# Patient Record
Sex: FEMALE | Race: WHITE
[De-identification: names, ages, dates, MRNs, and addresses within clinical notes are randomized per-mention and may not be internally consistent; named-entity substitution may affect disease eponyms.]

---

## 2017-12-04 ENCOUNTER — HOSPITAL ENCOUNTER (OUTPATIENT)
Dept: HOSPITAL 80 - FLD | Age: 32
Setting detail: OBSERVATION
Discharge: HOME | End: 2017-12-04
Attending: ADVANCED PRACTICE MIDWIFE | Admitting: ADVANCED PRACTICE MIDWIFE
Payer: COMMERCIAL

## 2017-12-04 DIAGNOSIS — O23.42: Primary | ICD-10-CM

## 2017-12-04 DIAGNOSIS — Z3A.27: ICD-10-CM

## 2017-12-04 LAB
BACTERIA #/AREA URNS HPF: (no result) /HPF
COLOR UR: (no result)
MUCOUS THREADS #/AREA URNS LPF: (no result) /LPF
NITRITE UR QL STRIP: NEGATIVE
PH UR STRIP: 8 [PH] (ref 5–7.5)
RBC #/AREA URNS HPF: (no result) /HPF (ref 0–3)
SP GR UR STRIP: 1 (ref 1–1.03)
WBC #/AREA URNS HPF: (no result) /HPF (ref 0–3)

## 2017-12-04 PROCEDURE — G0378 HOSPITAL OBSERVATION PER HR: HCPCS

## 2017-12-04 NOTE — GHP
[f rep st]



                                                            HISTORY AND PHYSICAL





DATE OF ADMISSION:  2017



TRIAGE NOTE



ADMITTING DIAGNOSES:  Intrauterine pregnancy at 27-6/7 weeks' gestation with leakage of fluid, rule o
ut rupture of membranes.



HISTORY OF PRESENT ILLNESS:  The patient is a 32-year-old  3, para 0-0-2-0 with a last menstru
al period of 2017, and an EDC of 2018 which was confirmed by an 8-week ultrasound.  She p
resented with an episode of leakage of clear fluid at 2:30 a.m. as she was sleeping and concern for r
upture of membranes.  The patient has had increased urinary frequency and urinary incontinence in thi
s pregnancy, increasing over the last 2 weeks, but this episode felt different to her, felt more conc
erning for rupture of membranes.  She denies contractions or cramping.  Has had good fetal movement. 
 Does report some urinary frequency, episodes of incontinence, and some vaginal sensitivity, but no i
ncreased vaginal discharge or pruritus, burning, or other symptoms.



PHYSICAL EXAMINATION:  VITAL SIGNS:  Upon evaluation, the patient's vital signs are stable.  PELVIC: 
 Fetal heart tones are 130s, appropriate for gestational age.  She is not prince.  Sterile specu
lum exam is normal external genitalia.  No abnormal discharge.  Wet prep and KOH are negative.  Cervi
x is closed, long, and firm.  



AmniSure was performed which was negative.  A vaginal culture as well as a urinalysis and culture are
 pending.



ASSESSMENT AND PLAN:  32-year-old,  3, para 0-0-2-0 at 27-6/7 weeks' gestation with an episode
 of leakage of fluid, presumed urinary continence and perhaps urinary tract infection.  On urine dip 
she had large blood.  We are awaiting the formal urinalysis and culture.  I will treat her empiricall
y for a urinary tract infection.  Vaginal culture is pending.  I do not assume she has a vaginal infe
ction, and she will be discharged home with normal precautions.





Job #:  [id number]

## 2018-02-03 ENCOUNTER — HOSPITAL ENCOUNTER (OUTPATIENT)
Dept: HOSPITAL 80 - FLD | Age: 33
Setting detail: OBSERVATION
Discharge: HOME | End: 2018-02-03
Attending: OBSTETRICS & GYNECOLOGY | Admitting: ADVANCED PRACTICE MIDWIFE
Payer: COMMERCIAL

## 2018-02-03 DIAGNOSIS — Z3A.36: ICD-10-CM

## 2018-02-03 DIAGNOSIS — Z03.79: Primary | ICD-10-CM

## 2018-02-03 PROCEDURE — G0378 HOSPITAL OBSERVATION PER HR: HCPCS

## 2018-02-03 NOTE — OBPROG
Labor Progress Note


Assessment/Plan: 


Assessment:cat 1 fhr


denies contractions


amnisure sent


negative nitrazine


























Plan:wait for amnisure results. Fu in office this week





02/03/18 12:42





Subjective/Intrapartum Course: 





02/03/18 12:42


Thought possibly rom at [id number] clear fluid. Feeling positive fetal movement. 

Denies contractions








- Contraction Pattern Assessment


Current Contraction Pattern: Irregular





- FHR Assessment


  ** Juarez


FHR (bpm): 140


FHR Pattern Variability: Moderate


FHR Category: 1





- Physical Exam


General Appearance: WD/WN, alert, no apparent distress


Respiratory: chest non-tender, lungs clear, normal breath sounds


Cardiac/Chest: regular rate, rhythm


Abdomen: normal bowel sounds


Extremities: normal range of motion, Carla's sign (negative bilaterally)


DTR- Lower Extremities: Knee (R): 1+, Knee (L): 1+ (no clonus)


Skin: normal color, warm/dry


Neuro/Psych: no motor/sensory deficits, alert, normal mood/affect, oriented x 3





ICD10 Worksheet


Patient Problems: 


 Problems











Problem Status Onset


 


UTI (urinary tract infection) Acute

## 2018-02-03 NOTE — OBPROG
Labor Progress Note


Assessment/Plan: 


Assessment:cat 1 fhr


denies contractions


amnisure sent


negative nitrazine


amnisure negative


























Plan:Dischagre to home with instructions fu this week in the office. Discussed 

 labor precautions





18 12:42





18 12:46





Subjective/Intrapartum Course: 





18 12:42


Thought possibly rom at [phone] clear fluid. Feeling positive fetal movement. 

Denies contractions








- Contraction Pattern Assessment


Current Contraction Pattern: Irregular





ICD10 Worksheet


Patient Problems: 


 Problems











Problem Status Onset


 


UTI (urinary tract infection) Acute
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
## 2018-02-28 ENCOUNTER — HOSPITAL ENCOUNTER (INPATIENT)
Dept: HOSPITAL 80 - FLD | Age: 33
LOS: 5 days | Discharge: HOME | End: 2018-03-05
Attending: OBSTETRICS & GYNECOLOGY | Admitting: OBSTETRICS & GYNECOLOGY
Payer: COMMERCIAL

## 2018-02-28 DIAGNOSIS — O99.820: ICD-10-CM

## 2018-02-28 DIAGNOSIS — O48.0: Primary | ICD-10-CM

## 2018-02-28 DIAGNOSIS — Z3A.40: ICD-10-CM

## 2018-02-28 DIAGNOSIS — D64.9: ICD-10-CM

## 2018-02-28 LAB — PLATELET # BLD: 186 10^3/UL (ref 150–400)

## 2018-02-28 RX ADMIN — MISOPROSTOL PRN MCG: 100 TABLET ORAL at 23:42

## 2018-03-01 PROCEDURE — 3E033VJ INTRODUCTION OF OTHER HORMONE INTO PERIPHERAL VEIN, PERCUTANEOUS APPROACH: ICD-10-PCS | Performed by: ADVANCED PRACTICE MIDWIFE

## 2018-03-01 PROCEDURE — 3E03329 INTRODUCTION OF OTHER ANTI-INFECTIVE INTO PERIPHERAL VEIN, PERCUTANEOUS APPROACH: ICD-10-PCS | Performed by: ADVANCED PRACTICE MIDWIFE

## 2018-03-01 RX ADMIN — AMPICILLIN SODIUM SCH MLS: 1 INJECTION, POWDER, FOR SOLUTION INTRAMUSCULAR; INTRAVENOUS at 16:35

## 2018-03-01 RX ADMIN — AMPICILLIN SODIUM SCH MLS: 1 INJECTION, POWDER, FOR SOLUTION INTRAMUSCULAR; INTRAVENOUS at 03:29

## 2018-03-01 RX ADMIN — AMPICILLIN SODIUM SCH MLS: 1 INJECTION, POWDER, FOR SOLUTION INTRAMUSCULAR; INTRAVENOUS at 12:00

## 2018-03-01 RX ADMIN — AMPICILLIN SODIUM SCH MLS: 1 INJECTION, POWDER, FOR SOLUTION INTRAMUSCULAR; INTRAVENOUS at 20:31

## 2018-03-01 RX ADMIN — AMPICILLIN SODIUM SCH MLS: 1 INJECTION, POWDER, FOR SOLUTION INTRAMUSCULAR; INTRAVENOUS at 08:03

## 2018-03-01 RX ADMIN — MISOPROSTOL PRN MCG: 100 TABLET ORAL at 03:36

## 2018-03-01 NOTE — OBPROG
Labor Progress Note


Assessment/Plan: 


Assessment:


34 y/o  at 40.2 wks EGA 


IOL - mayers bulb in place/pitocin 8mu


GBS pos


Cat 1 EFM 


Cntx mild q2-4 














Plan:


Continue pitocin induction


Continue GBS prophylaxis


Reassess PRN


Consider removing mayers at 2100 and AROM if appropriate


Pain management PRN





18 17:57





Subjective/Intrapartum Course: 





18 18:23


Patient doing well. Appears comfortable with contractions.  FOB/mom at bedside 

and supportive.  


Objective: 





 





 18 23:00 





 











Patient ABO/Rh  A POSITIVE   18  23:00    








No SVE - mayers bulb still in place





- SVE


Membranes: Intact





- Contraction Pattern Assessment


Current Contraction Pattern: Irregular





- FHR Assessment


  ** Juarez


FHR (bpm): 145


FHR Pattern Variability: Moderate


FHR Category: 1





- Physical Exam


General Appearance: WD/WN (Patient ambulating in halls, appears comfortable), 

alert





Oxytocin Orders Assessment





- Pre-Induction/Augmentation Assessment


Gestational Age: 40 week(s) and 1 day(s)





ICD10 Worksheet


Patient Problems: 


 Problems











Problem Status Onset


 


Supervision of other normal pregnancy Acute  














- ICD10 Problem Qualifiers


(1) Supervision of other normal pregnancy

## 2018-03-01 NOTE — PREANESOB
Obstetric Pre-Anesthesia Info





- General Info


Proposed Procedure: Labor Epidural


: 3


Para: 0


ALVINO: 18


Gestational Age: 40 week(s) and 1 day(s)





- Labor Status


Indications for Labor Analgesia: Pain Control


Labor Epidural: Yes





Anesthesia


Allergies/Adverse Reactions: 


 











Allergy/AdvReac Type Severity Reaction Status Date / Time


 


No Known Allergies Allergy   Verified 17 09:53











Home Medications: 














 Medication  Instructions  Recorded


 


Birth Control Pill  12


 


Metoprolol Tartrate  10/01/16


 


Prenatal  10/01/16











Visit Medications: 





 











Generic Name Dose Route Start Last Admin





  Trade Name Freq  PRN Reason Stop Dose Admin


 


Ammonia (Aromatic Spirit)  1 each  18 22:51  





  Ammonia Aromatic  IH  03/10/18 22:50  





  ONCE PRN   





  Fainting   


 


Oxytocin 20 unit/ Lactated  1,002 mls @ 150 mls/hr  18 22:51  





  Ringer's  IV   





  PRN PRN   





  Post-partum bleeding   


 


Ampicillin Sodium 1 gm/  15 mls @ 60 mls/hr  18 03:30  18 20:31





  Sterile Water  IV  18 03:29  15 mls





  Q4H ALL   Administration


 


Lactated Ringer's  500 mls @ 500 mls/hr  18 09:02  





  Lr  IV  18 09:02  





  PRN PRN   





  Maternal Hypotension   


 


Oxytocin 30 unit/ Sodium  503 mls @ 0 mls/hr  18 09:15  18 14:43





  Chloride  IV  18 09:14  503 mls





  CONT ALL   Administration





  Protocol   





  Per Protocol   


 


Ibuprofen  600 mg  18 22:51  





  Motrin  PO  18 22:50  





  Q6HRS PRN   





  post partum, inflammation   


 


Magnesium Sulfate  454 gm  18 22:51  





  Epsom Salt  TP  18 22:50  





  Q1H PRN   





  perineal discomfort    


 


Misoprostol  800 - 1,000 mcg  18 22:51  





  Cytotec  PO   





  ONCE PRN   





  Vaginal Atony/Bleeding   


 


Misoprostol  50 mcg  18 22:54  18 03:36





  Cytotec  PO  18 22:53  50 mcg





  Q4 PRN   Administration





  labor    


 


Olive Oil  118 ml  18 22:51  





  Sweet Oil  MISC  18 22:50  





  ONCE PRN   





  perineal massage   


 


Terbutaline Sulfate  0.25 mg  18 22:51  





  Brethine  IV  18 22:50  





  ONCE PRN   





  Tachysystole   














Discontinued Medications














Generic Name Dose Route Start Last Admin





  Trade Name Zca  PRN Reason Stop Dose Admin


 


Ammonia (Aromatic Spirit)  Confirm  18 00:50  





  Ammonia Aromatic  Administered  18 00:51  





  Dose   





  1 each   





  IH   





  .STK-MED ONE   


 


Fentanyl/Bupivacaine HCl  Confirm  18 23:07  





  Fentanyl/Bupivacaine/Ns 4 Mcg/Ml 0.0625%(Rtu)  Administered  18 23:08  





  Dose   





  250 ml   





  EP   





  .STK-MED ONE   


 


Lactated Ringer's  1,000 mls @ 0 mls/hr  18 22:51  





  Lr  IV  18 22:50  





  PRN PRN   





  SEE PROTOCOL CONDITIONS   





  Protocol   





  Per Protocol   


 


Ampicillin Sodium 2 gm/  25 mls @ 100 mls/hr  18 23:30  18 00:16





  Sterile Water  IV  18 23:44  25 mls





  ONCE ONE   Administration


 


Lidocaine HCl  Confirm  18 00:49  





  Lidocaine Hcl 1%  Administered  18 00:50  





  Dose   





  300 mg   





  .ROUTE   





  .STK-MED ONE   


 


Misoprostol  Confirm  18 00:50  





  Cytotec  Administered  18 00:51  





  Dose   





  1,000 mcg   





  .ROUTE   





  .STK-MED ONE   


 


Olive Oil  Confirm  18 00:50  





  Sweet Oil  Administered  18 00:51  





  Dose   





  118 ml   





  .ROUTE   





  .STK-MED ONE   


 


Oxytocin  Confirm  18 00:50  





  Pitocin  Administered  18 00:51  





  Dose   





  40 unit   





  .ROUTE   





  .STK-MED ONE   


 


Phenylephrine HCl  Confirm  18 23:07  





  Neosynephrine  Administered  18 23:08  





  Dose   





  1,000 mcg   





  .ROUTE   





  .STK-MED ONE   


 


Terbutaline Sulfate  Confirm  18 00:50  





  Brethine  Administered  18 00:51  





  Dose   





  1 mg   





  .ROUTE   





  .STK-MED ONE   














- Vital Signs


Height/Weight (Nursing): 





 











Height                         167.64 cm


 


Weight                         85.729 kg

















- Focused Exam


Neck exam: FROM


Mallampati Score: Class 2


Mouth exam: normal dental/mouth exam


Pulmonary: clear to auscultation


Cardiovascular: regular rate and rhythym


Labs: 





 





 18 23:00 





 











Patient ABO/Rh  A POSITIVE   18  23:00    














- Plan


Consent Signed and on Chart: Yes


Patient/Guardian Understands and Agrees to Plan: Yes

## 2018-03-01 NOTE — PDGENHP
History and Physical


History and Physical: 








HPI: 


 Patient is a 31 yo G 3  P 0020 at 40.2 weeks EGA - presents to L&D for IOL. 


 EDC: 2018 which is based on LMP: 2017 which is known and consistent 

with Ultrasound at 6 weeks.


 Her pregnancy is complicated by:  


-frequent PVCs-sees [facility]


-h/o gerd


-h/o anxiety (not treated)


-GBS pos





Review of Systems:


 Constitutional: Denies any fever, chills, or fatigue


 HEENT: denies any visual changes, difficulty swallowing, hearing loss


 Cardiovascular: Denies any chest pain, palpitations, leg swelling


 Respiratory: denies any cough, wheezing, or shortness of breathe


 GI: Denies any nausea, vomiting, diarrhea, constipation


 : denies any dysuria, urgency, frequency, vaginal bleeding


 Musculoskeletal: denies any muscle or bone pain


 Skin: denies any rashes


 Neuro: denies any headache, seizures, lightheadedness, dizziness, or loss of 

consciousness


 Psychiatric: denies any current depression, anxiety, or SI/HI thoughts





HISTORY: 


 Previous OB history:  h/o SAB X1, TAB X1 after pregnancy with IUD


 Past medical history:  anxiety, h/o frequent PVCs, eczema


 Past surgical history: Right foot surgery 


 Medications: PNV, DHA, magnesium, vit D  


 Allergies : NKDA





PRENATAL LABS:


 Rh: A pos


 ABS: Neg


 Rubella: Immune


 HbsAg: NR


 HIV: NR


 VDRL: NR


 1hr:  90


 GC: Neg


 Chlamydia: Neg 


 Pap: Normal


 GBS:  pos 





BMI: (prepreg) 24.5





PHYSICAL EXAM:


 Constitutional: WN, A&Ox3


 HEENT: normocephalic atraumatic, supple


 Heart: RRR, no murmur


 Chest: CTA-B


 Abdomen: Soft, nontender, gravid


 SVE: 1/60/-2


 Extremities: mild pedal edema, negative ileana's sign


 Neuro: grossly normal


 Psych: normal affect





Fetal assessment: Reassuring FHTs, baseline 130 +accels, no decels, moderate 

variability 


 Contractions: toco irregular mild





Assessment: 


 1) 32 yo G3  P 0020 with IUP@  40.2 weeks EGA


 2) IOL


 3) GBS pos


 4) Cat 1 FHR tracing





Plan: 


 1) Admit to L&D


 2) IOL 


3) Had cytotec X2 overnight


4) Bernstein bulb placed


5) pitocin per protocol


6)AROM PRN


7) Pain management PRN pre patient request


8) Anticipate

## 2018-03-02 PROCEDURE — 10907ZC DRAINAGE OF AMNIOTIC FLUID, THERAPEUTIC FROM PRODUCTS OF CONCEPTION, VIA NATURAL OR ARTIFICIAL OPENING: ICD-10-PCS | Performed by: ADVANCED PRACTICE MIDWIFE

## 2018-03-02 RX ADMIN — AMPICILLIN SODIUM SCH: 1 INJECTION, POWDER, FOR SOLUTION INTRAMUSCULAR; INTRAVENOUS at 20:10

## 2018-03-02 RX ADMIN — KETOROLAC TROMETHAMINE SCH MG: 30 INJECTION, SOLUTION INTRAMUSCULAR at 23:28

## 2018-03-02 RX ADMIN — AMPICILLIN SODIUM SCH MLS: 1 INJECTION, POWDER, FOR SOLUTION INTRAMUSCULAR; INTRAVENOUS at 04:32

## 2018-03-02 RX ADMIN — AMPICILLIN SODIUM SCH MLS: 1 INJECTION, POWDER, FOR SOLUTION INTRAMUSCULAR; INTRAVENOUS at 12:13

## 2018-03-02 RX ADMIN — AMPICILLIN SODIUM SCH MLS: 1 INJECTION, POWDER, FOR SOLUTION INTRAMUSCULAR; INTRAVENOUS at 00:31

## 2018-03-02 RX ADMIN — AMPICILLIN SODIUM SCH MLS: 1 INJECTION, POWDER, FOR SOLUTION INTRAMUSCULAR; INTRAVENOUS at 08:46

## 2018-03-02 RX ADMIN — KETOROLAC TROMETHAMINE SCH MG: 30 INJECTION, SOLUTION INTRAMUSCULAR at 17:06

## 2018-03-02 NOTE — OBPROG
Labor Progress Note


Assessment/Plan: 


Assessment:LATE NOTE - PT WAS REASSESSED AT 12:45


IUP at 40 + wks


long induction


arrest of dilation - still 4/90/-2


GBS +


low grade temp - will give tylenol























Plan:


proceed with c/s for delivery, pt advised of risks and benefits and consent 

signed


03/02/18 07:47





03/02/18 09:15





03/02/18 14:53





Subjective/Intrapartum Course: 





03/01/18 18:23


Patient doing well. Appears comfortable with contractions.  FOB/mom at bedside 

and supportive.  


03/01/18 (late entry for 2045)


Pt doing well, reports mild pain with contractions. She is breathing through 

some contractions. She denies any LOF, VB. She has FOB/mother at BS supportive. 

She is ambulating and using birthing ball for pain relief at this time.








03/02/18  (late entry for 0030)


Pt comfortable with JOSE. Denies any pain or pressure. She is exhausted, desires 

to rest at this time. FOB @ BS and supportive.





03/02/18 06:16


Pt comfortable, was able to rest a couple hours. She denies any pain. 











03/02/18 07:50


Pt doing ok, good mental attitude, slight left side discomfort.  having jello 

and juice.  discuss good FHTs and good ctns by IUPC


03/02/18 09:17


pt doing ok - comfortable, disc effacement and hope for cervical change on next 

exam


03/02/18 14:56


Pt remaining comfortable.  advised of lack of cervical change and still 4/90/

possibly -1 but maybe just longer caput.  Rec c/s and pt understands and is 

ready.  disc risks and benefits and consent signed


Objective: 





 





 02/28/18 23:00 





 











Patient ABO/Rh  A POSITIVE   02/28/18  23:00    














- SVE


Dilation (cm): 4


Effacement (%): 90


Station: -1


Membranes: AROM


Amniotic Fluid Color: Clear





- Contraction Pattern Assessment


Current Contraction Pattern: Regular (on pit 20 mu/min with MVUs 140-160), 

Irregular





- FHR Assessment


  ** Juarez


FHR (bpm): 140


FHR Pattern Variability: Moderate


FHR Category: 1





- Procedures


Non-surgical Procedures: IUPC





Oxytocin Orders Assessment





- Pre-Induction/Augmentation Assessment


Fetal Presentation: Vertex


Gestational Age: 40 week(s) and 1 day(s)





ICD10 Worksheet


Patient Problems: 


 Problems











Problem Status Onset


 


Supervision of other normal pregnancy Acute

## 2018-03-02 NOTE — OBPROG
Labor Progress Note


Assessment/Plan: 


 Assessment:





[id number]


IOL- pitocin @ 18


GBS+


cat 2 FHR tracing


dysfunctional labor pattern











Plan:


reposition pt 


cont pitocin


reassess 2-4hr/PRN


report given to JOSE F Moreau MD














18 06:05





18 06:15





18 06:20





Subjective/Intrapartum Course: 





18 18:23


Patient doing well. Appears comfortable with contractions.  FOB/mom at bedside 

and supportive.  


18 (late entry for )


Pt doing well, reports mild pain with contractions. She is breathing through 

some contractions. She denies any LOF, VB. She has FOB/mother at BS supportive. 

She is ambulating and using birthing ball for pain relief at this time.








18  (late entry for 30)


Pt comfortable with JOSE. Denies any pain or pressure. She is exhausted, desires 

to rest at this time. FOB @ BS and supportive.





18 06:16


Pt comfortable, was able to rest a couple hours. She denies any pain. 











Objective: 





 





 18 23:00 





 











Patient ABO/Rh  A POSITIVE   18  23:00    














- SVE


Dilation (cm): 4


Effacement (%): 75


Station: -2


Membranes: AROM


Amniotic Fluid Color: Clear





- Contraction Pattern Assessment


Current Contraction Pattern: Irregular





- FHR Assessment


  ** Juarez


FHR (bpm): 145


FHR Pattern Variability: Moderate


FHR Category: 2 (MVUs adequate intermittently, but appears to be in 

dysfunctional labor pattern)





- Procedures


Non-surgical Procedures: IUPC





Oxytocin Orders Assessment





- Pre-Induction/Augmentation Assessment


Fetal Presentation: Vertex


Gestational Age: 40 week(s) and 1 day(s)





ICD10 Worksheet


Patient Problems: 


 Problems











Problem Status Onset


 


Supervision of other normal pregnancy Acute

## 2018-03-02 NOTE — OBPROG
Labor Progress Note


Assessment/Plan: 


 LATE ENTRY (@)


Assessment:





[id number]


IOL- pitocin @ 18


GBS+


cat 1 FHR tracing


AROM- clear











Plan:


cont pitocin


pain management PRN


reassess 2hr/PRN











18 05:53





18 05:57





Subjective/Intrapartum Course: 





18 18:23


Patient doing well. Appears comfortable with contractions.  FOB/mom at bedside 

and supportive.  


18 05:48 (late entry for )


Pt doing well, reports mild pain with contractions. She is breathing through 

some contractions. She denies any LOF, VB. She has FOB/mother at  supportive. 

She is ambulating and using birthing ball for pain relief at this time.








Objective: 





 





 18 23:00 





 











Patient ABO/Rh  A POSITIVE   18  23:00    














- SVE


Dilation (cm): 4


Effacement (%): 50


Station: -2


Membranes: Intact





- Contraction Pattern Assessment


Current Contraction Pattern: Irregular





- FHR Assessment


  ** Juarez


FHR (bpm): 145


FHR Pattern Variability: Moderate


FHR Category: 1





- Procedures


Non-surgical Procedures: Amniotomy





CNM Assessment





- Uterine Assessment


Contraction Strength: Moderate


Uterine Resting Tone: Palpates Soft Between Uterine Contractions





Oxytocin Orders Assessment





- Pre-Induction/Augmentation Assessment


Fetal Presentation: Vertex


Gestational Age: 40 week(s) and 1 day(s)


Current Contraction Pattern: Irregular





- Fetal Heart Rate Pattern


  ** Juarez


FHR Baseline (bpm): 145


FHR Category: 1


FHR Pattern Variability: Moderate


FHR Accelerations: Present





- Kwong's Score


Dilation: 3-4cm


Effacement: 40-50


Station: -2


Cervix: Soft


Cervix Position: Mid


Kwong Score Total: 7





- Induction/Augmentation Consent


Risks/Benefits of Procedure Reviewed/Pt Agrees to Proceed: Yes





ICD10 Worksheet


Patient Problems: 


 Problems











Problem Status Onset


 


Supervision of other normal pregnancy Acute

## 2018-03-02 NOTE — OBPROG
Labor Progress Note


Assessment/Plan: 


Assessment:


IUP at 40 + wks


long induction


currently early labor at 4/75/-2 on last exam at 5:30


GBS +























Plan:


cont pitocin, cont  abx,  adeq ctxns by IUPC


03/02/18 07:47





Subjective/Intrapartum Course: 





03/01/18 18:23


Patient doing well. Appears comfortable with contractions.  FOB/mom at bedside 

and supportive.  


03/01/18 (late entry for 2045)


Pt doing well, reports mild pain with contractions. She is breathing through 

some contractions. She denies any LOF, VB. She has FOB/mother at BS supportive. 

She is ambulating and using birthing ball for pain relief at this time.








03/02/18  (late entry for 0030)


Pt comfortable with JOSE. Denies any pain or pressure. She is exhausted, desires 

to rest at this time. FOB @ BS and supportive.





03/02/18 06:16


Pt comfortable, was able to rest a couple hours. She denies any pain. 











03/02/18 07:50


Pt doing ok, good mental attitude, slight left side discomfort.  having jello 

and juice.  discuss good FHTs and good ctns by IUPC


Objective: 





 





 02/28/18 23:00 





 











Patient ABO/Rh  A POSITIVE   02/28/18  23:00    














- SVE


Membranes: AROM


Amniotic Fluid Color: Clear





- Contraction Pattern Assessment


Current Contraction Pattern: Irregular





- FHR Assessment


  ** Juarez


FHR (bpm): 140


FHR Pattern Variability: Moderate


FHR Category: 1





- Procedures


Non-surgical Procedures: IUPC





Oxytocin Orders Assessment





- Pre-Induction/Augmentation Assessment


Fetal Presentation: Vertex


Gestational Age: 40 week(s) and 1 day(s)





ICD10 Worksheet


Patient Problems: 


 Problems











Problem Status Onset


 


Supervision of other normal pregnancy Acute

## 2018-03-02 NOTE — OBDEL
Birth Info


Birth Type: Primary 


Presentation at Delivery: Vertex


L&D Analgesia/Anesthesia Type: Epidural


GBS+: Yes (mult doses of Amp)


Antibiotic Used for + GBS: Ampicillin


Intrapartum Medications: 





 











Generic Name Dose Route Start Last Admin





  Trade Name Freq  PRN Reason Stop Dose Admin


 


Ampicillin Sodium 1 gm/  15 mls @ 60 mls/hr  18 03:30  18 12:13





  Sterile Water  IV  18 03:29  15 mls





  Q4H ALL   Administration


 


Oxytocin 30 unit/ Sodium  503 mls @ 0 mls/hr  18 09:15  18 14:43





  Chloride  IV  18 09:14  503 mls





  CONT ALL   Administration





  Protocol   





  Per Protocol   


 


Misoprostol  50 mcg  18 22:54  18 03:36





  Cytotec  PO  18 22:53  50 mcg





  Q4 PRN   Administration





  labor    














Discontinued Medications














Generic Name Dose Route Start Last Admin





  Trade Name Freq  PRN Reason Stop Dose Admin


 


Acetaminophen  1,000 mg  18 14:14  18 14:16





  Tylenol  PO  18 14:15  1,000 mg





  ONCE ONE   Administration


 


Ampicillin Sodium 2 gm/  25 mls @ 100 mls/hr  18 23:30  18 00:16





  Sterile Water  IV  18 23:44  25 mls





  ONCE ONE   Administration


 


Cefazolin Sodium/Dextrose  100 mls @ 200 mls/hr  18 14:16  18 14:55





  Ancef 2 Gm (Premix)  IV  18 14:45  Not Given





  ONCALL ONE   





  Protocol   


 


Cefazolin Sodium  2 gm in 20 mls @ 200 mls/hr  18 14:30  18 14:55





  Cefazolin Syringe  IVP  18 14:35  20 mls





  ONCALL ONE   Administration














- Infant Care Provider


Pediatrician/NNP: Irais Orlando





- Hospital Course


Intrapartum: 





18 18:23


Patient doing well. Appears comfortable with contractions.  FOB/mom at bedside 

and supportive.  


18 (late entry for )


Pt doing well, reports mild pain with contractions. She is breathing through 

some contractions. She denies any LOF, VB. She has FOB/mother at BS supportive. 

She is ambulating and using birthing ball for pain relief at this time.








18  (late entry for 0030)


Pt comfortable with JOSE. Denies any pain or pressure. She is exhausted, desires 

to rest at this time. FOB @  and supportive.





18 06:16


Pt comfortable, was able to rest a couple hours. She denies any pain. 











18 07:50


Pt doing ok, good mental attitude, slight left side discomfort.  having jello 

and juice.  discuss good FHTs and good ctns by IUPC


18 09:17


pt doing ok - comfortable, disc effacement and hope for cervical change on next 

exam


18 14:56


Pt remaining comfortable.  advised of lack of cervical change and still /

possibly -1 but maybe just longer caput.  Rec c/s and pt understands and is 

ready.  disc risks and benefits and consent signed


Indications for Delivery: Elective, Postterm Pregnancy Unfavorable Cervix





Vaginal Delivery





- Labor and Delivery


Amniotic Fluid Color: Clear


Non-surgical Procedures: IUPC





 Operative Report





- Delivery


Pre-op Diagnoses: IUP at 40+ wks, arrest of dilation


Post-op Diagnoses: same, delivered, persistent OP


History of Prior  Section: No


Nulliparous Prior to Delivery: No


Indications for Current  Section: Arrest of Dilation


Procedure: Unscheduled, Low Transverse


Surgeon: Sofie Moreau


Assistant: Hafsa Tilley


Anesthesiologist: Phu Church


Findings: 





nl ut, ov, tubes.  small extension on right lateral hysterotomy - o/w 

hysterotomy closed in nl fashion with double closure.  Baby in persistent OP 

position with marked caput in post acynclitic position.  long cord - clear 

fluid.  clear UOP


EBL: 1000





 Birth Data


ALVINO: 18


Gestational Age: 40 week(s) and 3 day(s)


  ** [place]


Delivery Date: 18


Delivery Time: 15:32


Sex of Infant: Male


Apgar Score (1 Min): 8


Apgar Score (5 Min): 9





ICD10 Worksheet


Patient Problems: 


 Problems











Problem Status Onset


 


 delivery delivered Acute  


 


Supervision of other normal pregnancy Acute  














- ICD10 Problem Qualifiers


(1)  delivery delivered

## 2018-03-02 NOTE — OBPROG
Labor Progress Note


Assessment/Plan: 


Assessment:


IUP at 40 + wks


long induction


currently at 4/90/-2, cont effacement but no dilation yet 


GBS +























Plan:


cont pitocin, cont  abx,  adeq ctxns by IU - will recheck in 3-4 hrs


03/02/18 07:47





03/02/18 09:15





Subjective/Intrapartum Course: 





03/01/18 18:23


Patient doing well. Appears comfortable with contractions.  FOB/mom at bedside 

and supportive.  


03/01/18 (late entry for 2045)


Pt doing well, reports mild pain with contractions. She is breathing through 

some contractions. She denies any LOF, VB. She has FOB/mother at BS supportive. 

She is ambulating and using birthing ball for pain relief at this time.








03/02/18  (late entry for 0030)


Pt comfortable with JOSE. Denies any pain or pressure. She is exhausted, desires 

to rest at this time. FOB @ BS and supportive.





03/02/18 06:16


Pt comfortable, was able to rest a couple hours. She denies any pain. 











03/02/18 07:50


Pt doing ok, good mental attitude, slight left side discomfort.  having jello 

and juice.  discuss good FHTs and good ctns by IUPC


03/02/18 09:17


pt doing ok - comfortable, disc effacement and hope for cervical change on next 

exam


Objective: 





 





 02/28/18 23:00 





 











Patient ABO/Rh  A POSITIVE   02/28/18  23:00    














- SVE


Dilation (cm): 4


Effacement (%): 90


Station: -2


Membranes: AROM


Amniotic Fluid Color: Clear





- Contraction Pattern Assessment


Current Contraction Pattern: Irregular





- FHR Assessment


  ** [place]


FHR (bpm): 140


FHR Pattern Variability: Moderate


FHR Category: 1





- Procedures


Non-surgical Procedures: IUPC





Oxytocin Orders Assessment





- Pre-Induction/Augmentation Assessment


Fetal Presentation: Vertex


Gestational Age: 40 week(s) and 1 day(s)





ICD10 Worksheet


Patient Problems: 


 Problems











Problem Status Onset


 


Supervision of other normal pregnancy Acute

## 2018-03-02 NOTE — OBPROG
Labor Progress Note


Assessment/Plan: 


 LATE ENTRY (@30)


Assessment:





[id number]


IOL- pitocin @ 18


GBS+


cat 1 FHR tracing


IUPC placed











Plan:


pitocin decreased to 12, will increase PRN


will reassess 2-4 hrs/PRN














18 06:05





Subjective/Intrapartum Course: 





18 18:23


Patient doing well. Appears comfortable with contractions.  FOB/mom at bedside 

and supportive.  


18 05:48 (late entry for )


Pt doing well, reports mild pain with contractions. She is breathing through 

some contractions. She denies any LOF, VB. She has FOB/mother at BS supportive. 

She is ambulating and using birthing ball for pain relief at this time.








18 06:01 (late entry for 30)


Pt comfortable with JOSE. Denies any pain or pressure. She is exhausted, desires 

to rest at this time. FOB @ BS and supportive.





Objective: 





 





 18 23:00 





 











Patient ABO/Rh  A POSITIVE   18  23:00    














- SVE


Dilation (cm): 4


Effacement (%): 50


Station: -2


Membranes: AROM


Amniotic Fluid Color: Clear





- Contraction Pattern Assessment


Current Contraction Pattern: Irregular





- FHR Assessment


  ** [place]


FHR (bpm): 150 (decelerations likely 2/2 to post JOSE. resolved with position 

changes.)


FHR Pattern Variability: Moderate


FHR Category: 2





- Procedures


Non-surgical Procedures: Amniotomy





Oxytocin Orders Assessment





- Pre-Induction/Augmentation Assessment


Fetal Presentation: Vertex


Gestational Age: 40 week(s) and 1 day(s)





ICD10 Worksheet


Patient Problems: 


 Problems











Problem Status Onset


 


Supervision of other normal pregnancy Acute

## 2018-03-03 RX ADMIN — DOCUSATE SODIUM AND SENNOSIDES SCH TAB: 50; 8.6 TABLET ORAL at 20:49

## 2018-03-03 RX ADMIN — KETOROLAC TROMETHAMINE SCH MG: 30 INJECTION, SOLUTION INTRAMUSCULAR at 12:10

## 2018-03-03 RX ADMIN — IBUPROFEN PRN MG: 600 TABLET ORAL at 17:54

## 2018-03-03 RX ADMIN — HYDROCODONE BITARTRATE AND ACETAMINOPHEN PRN TAB: 5; 325 TABLET ORAL at 07:49

## 2018-03-03 RX ADMIN — DOCUSATE SODIUM AND SENNOSIDES SCH TAB: 50; 8.6 TABLET ORAL at 07:48

## 2018-03-03 RX ADMIN — DOCUSATE SODIUM AND SENNOSIDES SCH TAB: 50; 8.6 TABLET ORAL at 21:33

## 2018-03-03 RX ADMIN — HYDROCODONE BITARTRATE AND ACETAMINOPHEN PRN TAB: 5; 325 TABLET ORAL at 16:25

## 2018-03-03 RX ADMIN — HYDROCODONE BITARTRATE AND ACETAMINOPHEN PRN TAB: 5; 325 TABLET ORAL at 20:49

## 2018-03-03 RX ADMIN — DOCUSATE SODIUM AND SENNOSIDES SCH: 50; 8.6 TABLET ORAL at 00:38

## 2018-03-03 RX ADMIN — KETOROLAC TROMETHAMINE SCH MG: 30 INJECTION, SOLUTION INTRAMUSCULAR at 06:17

## 2018-03-03 RX ADMIN — HYDROCODONE BITARTRATE AND ACETAMINOPHEN PRN TAB: 5; 325 TABLET ORAL at 12:10

## 2018-03-03 NOTE — OBPP
PostPartum Progress Note


Assessment/Plan: 


 Assessment:





[id number] 


s/p primary c/s


POD#1


anemia


breastfeeding











Plan:


routine Post op care


ambulate


void once mayers d/c'd


start PO iron


cont breastfeeding- work with lactation PRN


plan d/c home 48-72 hours














18 06:05





18 06:15





18 06:20





18 10:52





Subjective/Postpartum Course: 





18 10:53


Pt doing well, she is breastfeeding. She has been OOB in chair, plans to get 

OOB again soon. She has mayers draining via gravity, plans to be removed soon. 

She is happy with delivery. She expresses understanding of need for c/s. 

Bonding well with baby. She denies any depression/sadness. FOB @ BS, supportive.





Objective: 





 





 18 03:20 





 











Patient ABO/Rh  A POSITIVE   18  23:00    








 











Temp Pulse Resp BP Pulse Ox


 


 36.6 C   81   14   106/76   96 


 


 18 07:33  18 07:33  18 07:33  18 07:33  18 08:00











Uterine Position/Fundal Height: Umbilicus -1, Midline


Uterine Tone: Firm





PostPartum Physical Exam





- Physical Exam


Abdomen: dressing (C/D/I)

## 2018-03-04 VITALS — RESPIRATION RATE: 16 BRPM

## 2018-03-04 RX ADMIN — HYDROCODONE BITARTRATE AND ACETAMINOPHEN PRN TAB: 5; 325 TABLET ORAL at 04:58

## 2018-03-04 RX ADMIN — DOCUSATE SODIUM AND SENNOSIDES SCH TAB: 50; 8.6 TABLET ORAL at 21:24

## 2018-03-04 RX ADMIN — HYDROCODONE BITARTRATE AND ACETAMINOPHEN PRN TAB: 5; 325 TABLET ORAL at 17:09

## 2018-03-04 RX ADMIN — IBUPROFEN PRN MG: 600 TABLET ORAL at 18:47

## 2018-03-04 RX ADMIN — IBUPROFEN PRN MG: 600 TABLET ORAL at 12:02

## 2018-03-04 RX ADMIN — IBUPROFEN PRN MG: 600 TABLET ORAL at 06:13

## 2018-03-04 RX ADMIN — HYDROCODONE BITARTRATE AND ACETAMINOPHEN PRN TAB: 5; 325 TABLET ORAL at 13:22

## 2018-03-04 RX ADMIN — DOCUSATE SODIUM AND SENNOSIDES SCH TAB: 50; 8.6 TABLET ORAL at 08:52

## 2018-03-04 RX ADMIN — IBUPROFEN PRN MG: 600 TABLET ORAL at 00:06

## 2018-03-04 RX ADMIN — HYDROCODONE BITARTRATE AND ACETAMINOPHEN PRN TAB: 5; 325 TABLET ORAL at 00:54

## 2018-03-04 RX ADMIN — HYDROCODONE BITARTRATE AND ACETAMINOPHEN PRN TAB: 5; 325 TABLET ORAL at 21:23

## 2018-03-04 RX ADMIN — HYDROCODONE BITARTRATE AND ACETAMINOPHEN PRN TAB: 5; 325 TABLET ORAL at 08:51

## 2018-03-04 NOTE — OBPP
PostPartum Progress Note


Assessment/Plan: 


Assessment:


POD 2 s/p primary  section for arrest of dilation


mild anemia


























Plan:


routine care. iron daily


18 07:47





18 09:15





18 14:53





18 11:52





Subjective/Postpartum Course: 





18 10:53


Pt doing well, she is breastfeeding. She has been OOB in chair, plans to get 

OOB again soon. She has mayers draining via gravity, plans to be removed soon. 

She is happy with delivery. She expresses understanding of need for c/s. 

Bonding well with baby. She denies any depression/sadness. FOB @ BS, supportive.





18 11:53


Pt doing ok - tired - was up most of night with baby.  working on BF.  sore 

nipples.  pain is controlled with Norco and ibu.  urinating fine.  bld is 

light.  amb ok.


Objective: 





 





 18 03:20 





 











Patient ABO/Rh  A POSITIVE   18  23:00    








 











Temp Pulse Resp BP Pulse Ox


 


 36.2 C   81   14   105/71   96 


 


 18 08:00  18 08:00  18 08:00  18 08:00  18 08:00











Uterine Position/Fundal Height: Umbilicus -1


Uterine Tone: Firm





PostPartum Physical Exam





- Physical Exam


Abdomen: non-tender (approp post op tenderness), soft, other (FF at umb -1, 

incision CDI)


Extremities: non-tender, pedal edema (mild)


Skin: normal color, warm/dry


Neuro/Psych: alert, normal mood/affect

## 2018-03-05 VITALS
HEART RATE: 88 BPM | DIASTOLIC BLOOD PRESSURE: 74 MMHG | OXYGEN SATURATION: 96 % | TEMPERATURE: 98.4 F | SYSTOLIC BLOOD PRESSURE: 129 MMHG

## 2018-03-05 RX ADMIN — HYDROCODONE BITARTRATE AND ACETAMINOPHEN PRN TAB: 5; 325 TABLET ORAL at 06:31

## 2018-03-05 RX ADMIN — IBUPROFEN PRN MG: 600 TABLET ORAL at 12:44

## 2018-03-05 RX ADMIN — HYDROCODONE BITARTRATE AND ACETAMINOPHEN PRN TAB: 5; 325 TABLET ORAL at 15:05

## 2018-03-05 RX ADMIN — HYDROCODONE BITARTRATE AND ACETAMINOPHEN PRN TAB: 5; 325 TABLET ORAL at 00:56

## 2018-03-05 RX ADMIN — HYDROCODONE BITARTRATE AND ACETAMINOPHEN PRN TAB: 5; 325 TABLET ORAL at 10:26

## 2018-03-05 RX ADMIN — IBUPROFEN PRN MG: 600 TABLET ORAL at 00:55

## 2018-03-05 RX ADMIN — DOCUSATE SODIUM AND SENNOSIDES SCH TAB: 50; 8.6 TABLET ORAL at 10:26

## 2018-03-05 RX ADMIN — IBUPROFEN PRN MG: 600 TABLET ORAL at 06:32

## 2018-03-05 NOTE — OBGCSDC
General Delivery Information





- General Info


: 3


Para: 1


Abortions: 2


Birth Type: Primary 


L&D Analgesia/Anesthesia Type: Epidural


Admission Date: 18


Labs: 





 











Patient ABO/Rh  A POSITIVE   18  23:00    


 


Hct  34.0 % (38.0-47.0)  L  18  03:20    














- Hospital Course


Intrapartum: 





18 18:23


Patient doing well. Appears comfortable with contractions.  FOB/mom at bedside 

and supportive.  


18 (late entry for )


Pt doing well, reports mild pain with contractions. She is breathing through 

some contractions. She denies any LOF, VB. She has FOB/mother at BS supportive. 

She is ambulating and using birthing ball for pain relief at this time.








18  (late entry for 30)


Pt comfortable with JOSE. Denies any pain or pressure. She is exhausted, desires 

to rest at this time. FOB @ BS and supportive.





18 06:16


Pt comfortable, was able to rest a couple hours. She denies any pain. 











18 07:50


Pt doing ok, good mental attitude, slight left side discomfort.  having jello 

and juice.  discuss good FHTs and good ctns by IUPC


18 09:17


pt doing ok - comfortable, disc effacement and hope for cervical change on next 

exam


18 14:56


Pt remaining comfortable.  advised of lack of cervical change and still 4/90/

possibly -1 but maybe just longer caput.  Rec c/s and pt understands and is 

ready.  disc risks and benefits and consent signed


Postpartum: 


Uncomplicated postpartum course, breastfeeding challenging - going home with 

SNS.


18 10:53


Pt doing well, she is breastfeeding. She has been OOB in chair, plans to get 

OOB again soon. She has mayers draining via gravity, plans to be removed soon. 

She is happy with delivery. She expresses understanding of need for c/s. 

Bonding well with baby. She denies any depression/sadness. FOB @ BS, supportive.





18 11:53


Pt doing ok - tired - was up most of night with baby.  working on BF.  sore 

nipples.  pain is controlled with Norco and ibu.  urinating fine.  bld is 

light.  amb ok.


18 09:43


Doing well.  Breastfeeding is challenging - will try SNS today but peds is 

planning dc home for baby today too. Ambulating, voiding, tolerating regular 

diet, + flatus. 





Vaginal Birth





- Diagnosis


Amniotic Fluid Color: Clear





- Procedures


Non-surgical Procedures: IUPC





 Birth





- Delivery Providers


Surgeon: Sofie Moreau


Assistant: Hafsa Tilley


Anesthesiologist: Phu Church





-  Delivery


Number of Prior  Sections: 0


Indications for Current  Section: Arrest of Dilation


Non-surgical Procedures: IUPC


Surgical Procedures: Unscheduled, Low Transverse


EBL: 1000





[place] Birth Data


ALVINO: 18


Gestational Age: 40 week(s) and 6 day(s)


  ** [place]


Delivery Date: 18


Delivery Time: 15:32


Sex of Infant: Male


[place] Weight (gm): 3542 kg


Apgar Score (1 Min): 8


Apgar Score (5 Min): 9

## 2018-03-05 NOTE — OBPP
PostPartum Progress Note


Assessment/Plan: 


Assessment:32 [id number] POD#3 s/P primary LTCS secondary to arrest of dilation


























Plan: Discharge home today - see dc summary.





18 09:42





Subjective/Postpartum Course: 


Uncomplicated postpartum course, breastfeeding challenging - going home with 

SNS.


18 10:53


Pt doing well, she is breastfeeding. She has been OOB in chair, plans to get 

OOB again soon. She has mayers draining via gravity, plans to be removed soon. 

She is happy with delivery. She expresses understanding of need for c/s. 

Bonding well with baby. She denies any depression/sadness. FOB @ BS, supportive.





18 11:53


Pt doing ok - tired - was up most of night with baby.  working on BF.  sore 

nipples.  pain is controlled with Norco and ibu.  urinating fine.  bld is 

light.  amb ok.


18 09:43


Doing well.  Breastfeeding is challenging - will try SNS today but peds is 

planning dc home for baby today too. Ambulating, voiding, tolerating regular 

diet, + flatus. 


Objective: 





 





 18 03:20 





 











Patient ABO/Rh  A POSITIVE   18  23:00    








 











Temp Pulse Resp BP Pulse Ox


 


 36.9 C   88   16   129/74 H  96 


 


 18 08:00  18 08:00  18 08:00  18 08:00  18 08:00








gen - pleasant, in NAD


CV - RRR


chest - CTAB


abd - soft, fundus firm at umbilicus, + NABS, incision - c/d/i with steristrips


ext - 2+ edema, Carla's neg


Uterine Position/Fundal Height: At Umbilicus


Uterine Tone: Firm





PostPartum Physical Exam





- Physical Exam


General Appearance: WD/WN, alert


Respiratory: lungs clear, normal breath sounds


Cardiac/Chest: regular rate, rhythm


Abdomen: normal bowel sounds, other


Extremities: pedal edema (2+ bilateral LE), Carla's sign (neg)


Skin: normal color

## 2018-03-29 ENCOUNTER — HOSPITAL ENCOUNTER (OUTPATIENT)
Dept: HOSPITAL 80 - FLACT | Age: 33
End: 2018-03-29
Attending: OBSTETRICS & GYNECOLOGY
Payer: COMMERCIAL

## 2018-03-29 DIAGNOSIS — O92.5: Primary | ICD-10-CM

## 2018-03-29 PROCEDURE — G0463 HOSPITAL OUTPT CLINIC VISIT: HCPCS

## 2022-02-09 ENCOUNTER — APPOINTMENT (RX ONLY)
Dept: URBAN - NONMETROPOLITAN AREA CLINIC 1 | Facility: CLINIC | Age: 37
Setting detail: DERMATOLOGY
End: 2022-02-09

## 2022-02-09 DIAGNOSIS — L21.8 OTHER SEBORRHEIC DERMATITIS: ICD-10-CM | Status: INADEQUATELY CONTROLLED

## 2022-02-09 DIAGNOSIS — D485 NEOPLASM OF UNCERTAIN BEHAVIOR OF SKIN: ICD-10-CM | Status: INADEQUATELY CONTROLLED

## 2022-02-09 DIAGNOSIS — L30.9 DERMATITIS, UNSPECIFIED: ICD-10-CM | Status: INADEQUATELY CONTROLLED

## 2022-02-09 PROBLEM — D48.5 NEOPLASM OF UNCERTAIN BEHAVIOR OF SKIN: Status: ACTIVE | Noted: 2022-02-09

## 2022-02-09 PROCEDURE — 99203 OFFICE O/P NEW LOW 30 MIN: CPT | Mod: 25

## 2022-02-09 PROCEDURE — ? BIOPSY BY SHAVE METHOD

## 2022-02-09 PROCEDURE — 11102 TANGNTL BX SKIN SINGLE LES: CPT

## 2022-02-09 PROCEDURE — ? ADDITIONAL NOTES

## 2022-02-09 PROCEDURE — ? TREATMENT REGIMEN

## 2022-02-09 PROCEDURE — ? COUNSELING

## 2022-02-09 ASSESSMENT — LOCATION DETAILED DESCRIPTION DERM
LOCATION DETAILED: LEFT CENTRAL PARIETAL SCALP
LOCATION DETAILED: LEFT SUPERIOR MEDIAL MALAR CHEEK
LOCATION DETAILED: RIGHT INGUINAL CREASE
LOCATION DETAILED: RIGHT MEDIAL CANTHUS
LOCATION DETAILED: LEFT INFERIOR POSTERIOR NECK
LOCATION DETAILED: RIGHT CENTRAL PARIETAL SCALP

## 2022-02-09 ASSESSMENT — LOCATION SIMPLE DESCRIPTION DERM
LOCATION SIMPLE: GROIN
LOCATION SIMPLE: POSTERIOR NECK
LOCATION SIMPLE: SCALP
LOCATION SIMPLE: LEFT CHEEK
LOCATION SIMPLE: RIGHT EYELID

## 2022-02-09 ASSESSMENT — LOCATION ZONE DERM
LOCATION ZONE: FACE
LOCATION ZONE: EYELID
LOCATION ZONE: TRUNK
LOCATION ZONE: SCALP
LOCATION ZONE: NECK

## 2022-02-09 ASSESSMENT — BSA RASH: BSA RASH: 5

## 2022-02-09 NOTE — PROCEDURE: BIOPSY BY SHAVE METHOD
Detail Level: Detailed
Depth Of Biopsy: dermis
Was A Bandage Applied: Yes
Size Of Lesion In Cm: 0.8
X Size Of Lesion In Cm: 0
Biopsy Type: H and E
Biopsy Method: Personna blade
Anesthesia Type: 1% lidocaine with epinephrine and a 1:10 solution of 8.4% sodium bicarbonate
Anesthesia Volume In Cc (Will Not Render If 0): 0.5
Hemostasis: Aluminum Chloride
Wound Care: Petrolatum
Dressing: bandage
Destruction After The Procedure: No
Type Of Destruction Used: Curettage
Curettage Text: The wound bed was treated with curettage after the biopsy was performed.
Cryotherapy Text: The wound bed was treated with cryotherapy after the biopsy was performed.
Electrodesiccation Text: The wound bed was treated with electrodesiccation after the biopsy was performed.
Electrodesiccation And Curettage Text: The wound bed was treated with electrodesiccation and curettage after the biopsy was performed.
Silver Nitrate Text: The wound bed was treated with silver nitrate after the biopsy was performed.
Consent: Written consent was obtained and risk was reviewed  per signed  consent form. Biopsy was performed with patient verbal permission.
Post-care instructions were given and  reviewed with the patient in detail. Patient is to keep the biopsy site dry overnight, and then apply healing ointment daily until healed. Patient is instructed to call for any questions or problems.
Notification Instructions: Patient will be notified of biopsy results. However, patient instructed to call the office if not contacted within 2 weeks.
Billing Type: Third-Party Bill
Information: Selecting Yes will display possible errors in your note based on the variables you have selected. This validation is only offered as a suggestion for you. PLEASE NOTE THAT THE VALIDATION TEXT WILL BE REMOVED WHEN YOU FINALIZE YOUR NOTE. IF YOU WANT TO FAX A PRELIMINARY NOTE YOU WILL NEED TO TOGGLE THIS TO 'NO' IF YOU DO NOT WANT IT IN YOUR FAXED NOTE.

## 2023-02-02 ENCOUNTER — APPOINTMENT (RX ONLY)
Dept: URBAN - NONMETROPOLITAN AREA CLINIC 1 | Facility: CLINIC | Age: 38
Setting detail: DERMATOLOGY
End: 2023-02-02

## 2023-02-02 DIAGNOSIS — L82.1 OTHER SEBORRHEIC KERATOSIS: ICD-10-CM | Status: STABLE

## 2023-02-02 DIAGNOSIS — L57.8 OTHER SKIN CHANGES DUE TO CHRONIC EXPOSURE TO NONIONIZING RADIATION: ICD-10-CM | Status: STABLE

## 2023-02-02 DIAGNOSIS — L81.4 OTHER MELANIN HYPERPIGMENTATION: ICD-10-CM | Status: STABLE

## 2023-02-02 DIAGNOSIS — D22 MELANOCYTIC NEVI: ICD-10-CM | Status: STABLE

## 2023-02-02 DIAGNOSIS — D18.0 HEMANGIOMA: ICD-10-CM | Status: STABLE

## 2023-02-02 DIAGNOSIS — L20.89 OTHER ATOPIC DERMATITIS: ICD-10-CM | Status: INADEQUATELY CONTROLLED

## 2023-02-02 PROBLEM — D18.01 HEMANGIOMA OF SKIN AND SUBCUTANEOUS TISSUE: Status: ACTIVE | Noted: 2023-02-02

## 2023-02-02 PROBLEM — D22.5 MELANOCYTIC NEVI OF TRUNK: Status: ACTIVE | Noted: 2023-02-02

## 2023-02-02 PROCEDURE — ? TREATMENT REGIMEN

## 2023-02-02 PROCEDURE — ? COUNSELING

## 2023-02-02 PROCEDURE — ? PRESCRIPTION MEDICATION MANAGEMENT

## 2023-02-02 PROCEDURE — 99214 OFFICE O/P EST MOD 30 MIN: CPT

## 2023-02-02 PROCEDURE — ? PRESCRIPTION

## 2023-02-02 PROCEDURE — ? MDM - TREATMENT GOALS

## 2023-02-02 PROCEDURE — ? MEDICATION COUNSELING

## 2023-02-02 RX ORDER — TRIAMCINOLONE ACETONIDE 1 MG/G
OINTMENT TOPICAL
Qty: 30 | Refills: 2 | Status: ERX | COMMUNITY
Start: 2023-02-02

## 2023-02-02 RX ADMIN — TRIAMCINOLONE ACETONIDE: 1 OINTMENT TOPICAL at 00:00

## 2023-02-02 ASSESSMENT — SEVERITY ASSESSMENT 2020: SEVERITY 2020: MILD

## 2023-02-02 ASSESSMENT — LOCATION DETAILED DESCRIPTION DERM
LOCATION DETAILED: INFERIOR THORACIC SPINE
LOCATION DETAILED: LEFT INFERIOR CENTRAL MALAR CHEEK
LOCATION DETAILED: RIGHT INFERIOR CENTRAL MALAR CHEEK
LOCATION DETAILED: MID POSTERIOR NECK
LOCATION DETAILED: RIGHT INFERIOR MEDIAL UPPER BACK

## 2023-02-02 ASSESSMENT — LOCATION SIMPLE DESCRIPTION DERM
LOCATION SIMPLE: UPPER BACK
LOCATION SIMPLE: LEFT CHEEK
LOCATION SIMPLE: RIGHT CHEEK
LOCATION SIMPLE: POSTERIOR NECK
LOCATION SIMPLE: RIGHT UPPER BACK

## 2023-02-02 ASSESSMENT — ITCH NUMERIC RATING SCALE: HOW SEVERE IS YOUR ITCHING?: 4

## 2023-02-02 ASSESSMENT — LOCATION ZONE DERM
LOCATION ZONE: TRUNK
LOCATION ZONE: NECK
LOCATION ZONE: FACE

## 2023-02-02 NOTE — PROCEDURE: PRESCRIPTION MEDICATION MANAGEMENT
Detail Level: Zone
Initiate Treatment: Triamcinolone ointment AA neck twice a day twice a week
Render In Strict Bullet Format?: No
Plan: Moisturize multiple times daily

## 2023-02-02 NOTE — PROCEDURE: MEDICATION COUNSELING
Xelvikyz Pregnancy And Lactation Text: This medication is Pregnancy Category D and is not considered safe during pregnancy.  The risk during breast feeding is also uncertain.

## 2023-04-08 NOTE — PROCEDURE: MIPS QUALITY
Resident
Detail Level: Detailed
Quality 226: Preventive Care And Screening: Tobacco Use: Screening And Cessation Intervention: Patient screened for tobacco use and is an ex/non-smoker
Quality 130: Documentation Of Current Medications In The Medical Record: Current Medications Documented
Quality 431: Preventive Care And Screening: Unhealthy Alcohol Use - Screening: Patient not identified as an unhealthy alcohol user when screened for unhealthy alcohol use using a systematic screening method

## 2024-02-08 ENCOUNTER — APPOINTMENT (RX ONLY)
Dept: URBAN - NONMETROPOLITAN AREA CLINIC 1 | Facility: CLINIC | Age: 39
Setting detail: DERMATOLOGY
End: 2024-02-08

## 2024-02-08 DIAGNOSIS — D18.0 HEMANGIOMA: ICD-10-CM

## 2024-02-08 DIAGNOSIS — L57.8 OTHER SKIN CHANGES DUE TO CHRONIC EXPOSURE TO NONIONIZING RADIATION: ICD-10-CM | Status: STABLE

## 2024-02-08 DIAGNOSIS — B07.8 OTHER VIRAL WARTS: ICD-10-CM

## 2024-02-08 DIAGNOSIS — L81.4 OTHER MELANIN HYPERPIGMENTATION: ICD-10-CM

## 2024-02-08 DIAGNOSIS — D22 MELANOCYTIC NEVI: ICD-10-CM

## 2024-02-08 DIAGNOSIS — L82.1 OTHER SEBORRHEIC KERATOSIS: ICD-10-CM

## 2024-02-08 PROBLEM — D22.5 MELANOCYTIC NEVI OF TRUNK: Status: ACTIVE | Noted: 2024-02-08

## 2024-02-08 PROBLEM — D18.01 HEMANGIOMA OF SKIN AND SUBCUTANEOUS TISSUE: Status: ACTIVE | Noted: 2024-02-08

## 2024-02-08 PROCEDURE — 17110 DESTRUCTION B9 LES UP TO 14: CPT

## 2024-02-08 PROCEDURE — ? SUNSCREEN TREATMENT REGIMEN

## 2024-02-08 PROCEDURE — ? LIQUID NITROGEN

## 2024-02-08 PROCEDURE — 99213 OFFICE O/P EST LOW 20 MIN: CPT | Mod: 25

## 2024-02-08 PROCEDURE — ? COUNSELING

## 2024-02-08 ASSESSMENT — LOCATION DETAILED DESCRIPTION DERM
LOCATION DETAILED: INFERIOR THORACIC SPINE
LOCATION DETAILED: LEFT SUPERIOR UPPER BACK
LOCATION DETAILED: LEFT INDEX FINGER DISTAL INTERPHALANGEAL JOINT
LOCATION DETAILED: LEFT FOREHEAD
LOCATION DETAILED: RIGHT INFERIOR MEDIAL UPPER BACK

## 2024-02-08 ASSESSMENT — LOCATION SIMPLE DESCRIPTION DERM
LOCATION SIMPLE: UPPER BACK
LOCATION SIMPLE: RIGHT UPPER BACK
LOCATION SIMPLE: LEFT INDEX FINGER
LOCATION SIMPLE: LEFT UPPER BACK
LOCATION SIMPLE: LEFT FOREHEAD

## 2024-02-08 ASSESSMENT — LOCATION ZONE DERM
LOCATION ZONE: FINGER
LOCATION ZONE: FACE
LOCATION ZONE: TRUNK

## 2024-02-08 NOTE — PROCEDURE: LIQUID NITROGEN
Spray Paint Technique: No
Medical Necessity Information: It is in your best interest to select a reason for this procedure from the list below. All of these items fulfill various CMS LCD requirements except the new and changing color options.
Spray Paint Text: The liquid nitrogen was applied to the skin utilizing a spray paint frosting technique.
Post-Care Instructions: I reviewed with the patient in detail post-care instructions. Patient is to wear sunprotection, and avoid picking at any of the treated lesions. Pt may apply Vaseline to crusted or scabbing areas.
Show Applicator Variable?: Yes
Consent: The patient's consent was obtained including but not limited to risks of crusting, scabbing, blistering, scarring, darker or lighter pigmentary change, recurrence, incomplete removal and infection.
Medical Necessity Clause: This procedure was medically necessary because the lesions that were treated were:
Detail Level: Detailed
Number Of Freeze-Thaw Cycles: 2 freeze-thaw cycles

## 2024-03-05 ENCOUNTER — APPOINTMENT (RX ONLY)
Dept: URBAN - NONMETROPOLITAN AREA CLINIC 1 | Facility: CLINIC | Age: 39
Setting detail: DERMATOLOGY
End: 2024-03-05

## 2024-03-05 DIAGNOSIS — B07.8 OTHER VIRAL WARTS: ICD-10-CM

## 2024-03-05 PROCEDURE — 17110 DESTRUCTION B9 LES UP TO 14: CPT

## 2024-03-05 PROCEDURE — ? LIQUID NITROGEN

## 2024-03-05 PROCEDURE — ? COUNSELING

## 2024-03-05 ASSESSMENT — LOCATION SIMPLE DESCRIPTION DERM: LOCATION SIMPLE: LEFT MIDDLE FINGER

## 2024-03-05 ASSESSMENT — LOCATION DETAILED DESCRIPTION DERM: LOCATION DETAILED: LEFT DISTAL DORSAL MIDDLE FINGER

## 2024-03-05 ASSESSMENT — LOCATION ZONE DERM: LOCATION ZONE: FINGER

## 2024-03-26 ENCOUNTER — APPOINTMENT (RX ONLY)
Dept: URBAN - NONMETROPOLITAN AREA CLINIC 1 | Facility: CLINIC | Age: 39
Setting detail: DERMATOLOGY
End: 2024-03-26

## 2024-03-26 DIAGNOSIS — B07.8 OTHER VIRAL WARTS: ICD-10-CM | Status: INADEQUATELY CONTROLLED

## 2024-03-26 PROCEDURE — ? LIQUID NITROGEN

## 2024-03-26 PROCEDURE — ? PRESCRIPTION

## 2024-03-26 PROCEDURE — 17110 DESTRUCTION B9 LES UP TO 14: CPT

## 2024-03-26 PROCEDURE — ? COUNSELING

## 2024-03-26 RX ORDER — PHARMACY COMPOUNDING ACCESSORY
EACH MISCELLANEOUS
Qty: 30 | Refills: 1 | Status: ERX | COMMUNITY
Start: 2024-03-26

## 2024-03-26 RX ADMIN — Medication: at 00:00

## 2024-03-26 ASSESSMENT — LOCATION ZONE DERM: LOCATION ZONE: FINGER

## 2024-03-26 ASSESSMENT — LOCATION SIMPLE DESCRIPTION DERM: LOCATION SIMPLE: LEFT MIDDLE FINGER

## 2024-03-26 ASSESSMENT — LOCATION DETAILED DESCRIPTION DERM
LOCATION DETAILED: LEFT MID DORSAL MIDDLE FINGER
LOCATION DETAILED: LEFT DISTAL DORSAL MIDDLE FINGER

## 2024-03-26 NOTE — PROCEDURE: LIQUID NITROGEN
Add 52 Modifier (Optional): no
Consent: The patient's consent was obtained including but not limited to risks of crusting, scabbing, blistering, scarring, darker or lighter pigmentary change, recurrence, incomplete removal and infection.
Detail Level: Detailed
Show Applicator Variable?: Yes
Medical Necessity Information: It is in your best interest to select a reason for this procedure from the list below. All of these items fulfill various CMS LCD requirements except the new and changing color options.
Post-Care Instructions: I reviewed with the patient in detail post-care instructions. Patient is to wear sunprotection, and avoid picking at any of the treated lesions. Pt may apply Vaseline to crusted or scabbing areas.
Number Of Freeze-Thaw Cycles: 2 freeze-thaw cycles
Spray Paint Text: The liquid nitrogen was applied to the skin utilizing a spray paint frosting technique.
Medical Necessity Clause: This procedure was medically necessary because the lesions that were treated were:

## 2024-05-21 ENCOUNTER — APPOINTMENT (RX ONLY)
Dept: URBAN - NONMETROPOLITAN AREA CLINIC 1 | Facility: CLINIC | Age: 39
Setting detail: DERMATOLOGY
End: 2024-05-21

## 2024-05-21 DIAGNOSIS — L82.1 OTHER SEBORRHEIC KERATOSIS: ICD-10-CM

## 2024-05-21 DIAGNOSIS — B07.8 OTHER VIRAL WARTS: ICD-10-CM | Status: INADEQUATELY CONTROLLED

## 2024-05-21 PROCEDURE — ? COUNSELING

## 2024-05-21 PROCEDURE — 99212 OFFICE O/P EST SF 10 MIN: CPT | Mod: 25

## 2024-05-21 PROCEDURE — 17110 DESTRUCTION B9 LES UP TO 14: CPT

## 2024-05-21 PROCEDURE — ? LIQUID NITROGEN

## 2024-05-21 ASSESSMENT — LOCATION ZONE DERM
LOCATION ZONE: HAND
LOCATION ZONE: FINGER
LOCATION ZONE: ARM

## 2024-05-21 ASSESSMENT — LOCATION DETAILED DESCRIPTION DERM
LOCATION DETAILED: LEFT DISTAL DORSAL MIDDLE FINGER
LOCATION DETAILED: LEFT ULNAR DORSAL HAND
LOCATION DETAILED: RIGHT PROXIMAL POSTERIOR UPPER ARM

## 2024-05-21 ASSESSMENT — LOCATION SIMPLE DESCRIPTION DERM
LOCATION SIMPLE: LEFT MIDDLE FINGER
LOCATION SIMPLE: RIGHT POSTERIOR UPPER ARM
LOCATION SIMPLE: LEFT HAND

## 2024-05-21 NOTE — PROCEDURE: LIQUID NITROGEN
Detail Level: Detailed
Consent: The patient's consent was obtained including but not limited to risks of crusting, scabbing, blistering, scarring, darker or lighter pigmentary change, recurrence, incomplete removal and infection.
Add 52 Modifier (Optional): no
Medical Necessity Information: It is in your best interest to select a reason for this procedure from the list below. All of these items fulfill various CMS LCD requirements except the new and changing color options.
Show Applicator Variable?: Yes
Post-Care Instructions: I reviewed with the patient in detail post-care instructions. Patient is to wear sunprotection, and avoid picking at any of the treated lesions. Pt may apply Vaseline to crusted or scabbing areas.
Spray Paint Text: The liquid nitrogen was applied to the skin utilizing a spray paint frosting technique.
Medical Necessity Clause: This procedure was medically necessary because the lesions that were treated were:
Number Of Freeze-Thaw Cycles: 2 freeze-thaw cycles

## 2024-05-22 NOTE — PDMN
Medical Necessity


Medical necessity: C/M review:  Patient meets INPT criteria under MCG S-350 

 delivery:  viable male . New diagnosis. Start Ozempic 0.25mg/weekly. Risks and benefits of medication discussed and prescribed. Low carbohydrate diet and increase in activity. Follow up with Mary Henry in 1 month, follow up with me in 3 months for lab work.

## 2025-01-14 ENCOUNTER — APPOINTMENT (OUTPATIENT)
Dept: URBAN - NONMETROPOLITAN AREA CLINIC 1 | Facility: CLINIC | Age: 40
Setting detail: DERMATOLOGY
End: 2025-01-14

## 2025-01-14 DIAGNOSIS — L82.1 OTHER SEBORRHEIC KERATOSIS: ICD-10-CM | Status: STABLE

## 2025-01-14 DIAGNOSIS — D22 MELANOCYTIC NEVI: ICD-10-CM | Status: STABLE

## 2025-01-14 DIAGNOSIS — L57.8 OTHER SKIN CHANGES DUE TO CHRONIC EXPOSURE TO NONIONIZING RADIATION: ICD-10-CM | Status: STABLE

## 2025-01-14 DIAGNOSIS — D18.0 HEMANGIOMA: ICD-10-CM | Status: STABLE

## 2025-01-14 DIAGNOSIS — L81.4 OTHER MELANIN HYPERPIGMENTATION: ICD-10-CM | Status: STABLE

## 2025-01-14 PROBLEM — D22.5 MELANOCYTIC NEVI OF TRUNK: Status: ACTIVE | Noted: 2025-01-14

## 2025-01-14 PROBLEM — D18.01 HEMANGIOMA OF SKIN AND SUBCUTANEOUS TISSUE: Status: ACTIVE | Noted: 2025-01-14

## 2025-01-14 PROCEDURE — ? TREATMENT REGIMEN

## 2025-01-14 PROCEDURE — ? COUNSELING

## 2025-01-14 PROCEDURE — ? SUNSCREEN RECOMMENDATIONS

## 2025-01-14 PROCEDURE — ? FULL BODY SKIN EXAM

## 2025-01-14 PROCEDURE — 99213 OFFICE O/P EST LOW 20 MIN: CPT

## 2025-01-14 ASSESSMENT — LOCATION SIMPLE DESCRIPTION DERM
LOCATION SIMPLE: LEFT UPPER BACK
LOCATION SIMPLE: ABDOMEN
LOCATION SIMPLE: CHEST
LOCATION SIMPLE: UPPER BACK

## 2025-01-14 ASSESSMENT — LOCATION DETAILED DESCRIPTION DERM
LOCATION DETAILED: LEFT INFERIOR UPPER BACK
LOCATION DETAILED: SUPERIOR THORACIC SPINE
LOCATION DETAILED: EPIGASTRIC SKIN
LOCATION DETAILED: LEFT MEDIAL UPPER BACK
LOCATION DETAILED: RIGHT MEDIAL SUPERIOR CHEST

## 2025-01-14 ASSESSMENT — LOCATION ZONE DERM: LOCATION ZONE: TRUNK
